# Patient Record
Sex: FEMALE | Race: WHITE | NOT HISPANIC OR LATINO | Employment: OTHER | URBAN - METROPOLITAN AREA
[De-identification: names, ages, dates, MRNs, and addresses within clinical notes are randomized per-mention and may not be internally consistent; named-entity substitution may affect disease eponyms.]

---

## 2020-09-17 ENCOUNTER — CONSULT (OUTPATIENT)
Dept: CARDIOLOGY CLINIC | Facility: CLINIC | Age: 85
End: 2020-09-17
Payer: COMMERCIAL

## 2020-09-17 VITALS
HEART RATE: 83 BPM | TEMPERATURE: 97.1 F | SYSTOLIC BLOOD PRESSURE: 124 MMHG | HEIGHT: 65 IN | DIASTOLIC BLOOD PRESSURE: 80 MMHG | BODY MASS INDEX: 27.19 KG/M2 | WEIGHT: 163.2 LBS | OXYGEN SATURATION: 99 %

## 2020-09-17 DIAGNOSIS — I48.91 ATRIAL FIBRILLATION, UNSPECIFIED TYPE (HCC): Primary | ICD-10-CM

## 2020-09-17 DIAGNOSIS — I10 ESSENTIAL HYPERTENSION: ICD-10-CM

## 2020-09-17 PROCEDURE — 93000 ELECTROCARDIOGRAM COMPLETE: CPT | Performed by: INTERNAL MEDICINE

## 2020-09-17 PROCEDURE — 99205 OFFICE O/P NEW HI 60 MIN: CPT | Performed by: INTERNAL MEDICINE

## 2020-09-17 RX ORDER — POTASSIUM CHLORIDE 1500 MG/1
20 TABLET, EXTENDED RELEASE ORAL DAILY
COMMUNITY
Start: 2020-08-12

## 2020-09-17 RX ORDER — AMLODIPINE BESYLATE AND BENAZEPRIL HYDROCHLORIDE 5; 20 MG/1; MG/1
1 CAPSULE ORAL DAILY
COMMUNITY
Start: 2020-08-12

## 2020-09-17 RX ORDER — APIXABAN 5 MG/1
TABLET, FILM COATED ORAL
COMMUNITY
Start: 2020-09-08 | End: 2021-05-15 | Stop reason: HOSPADM

## 2020-09-17 NOTE — PATIENT INSTRUCTIONS

## 2020-09-17 NOTE — PROGRESS NOTES
Cardiology Consultation     Jose Carmichael  [id number]  1935  [address] [facility] [place] [id number]    1  Atrial fibrillation, unspecified type ([facility] )  POCT ECG   2  Essential hypertension       Chief Complaint   Patient presents with    Establish Care     Establish  Cardiac care- AFIB  No cardiac complaints at this time  HPI: Patient is here for establishment of care  She has afib, diagnosed about a year ago  Patient feels well, without complaints  No reported chest pain, shortness of breath, palpitations, lightheadedness, syncope, LE edema, orthopnea, PND, or significant weight changes  Patient remains active without any increased fatigue out of the ordinary  Patient Active Problem List   Diagnosis    Atrial fibrillation [facility])    Essential hypertension     Past Medical History:   Diagnosis Date    Atrial fibrillation ([facility] )     Hypertension      Social History     Socioeconomic History    Marital status:       Spouse name: Not on file    Number of children: Not on file    Years of education: Not on file    Highest education level: Not on file   Occupational History    Not on file   Social Needs    Financial resource strain: Not on file    Food insecurity     Worry: Not on file     Inability: Not on file    Transportation needs     Medical: Not on file     Non-medical: Not on file   Tobacco Use    Smoking status: Never Smoker    Smokeless tobacco: Never Used   Substance and Sexual Activity    Alcohol use: Not Currently    Drug use: Not Currently    Sexual activity: Not on file   Lifestyle    Physical activity     Days per week: Not on file     Minutes per session: Not on file    Stress: Not on file   Relationships    Social connections     Talks on phone: Not on file     Gets together: Not on file     Attends Sabianism service: Not on file     Active member of club or organization: Not on file     Attends meetings of clubs or organizations: Not on file     Relationship status: Not on file    Intimate partner violence     Fear of current or ex partner: Not on file     Emotionally abused: Not on file     Physically abused: Not on file     Forced sexual activity: Not on file   Other Topics Concern    Not on file   Social History Narrative    Not on file      Family History   Problem Relation Age of Onset    Hypertension Mother     Hyperlipidemia Neg Hx     Heart failure Neg Hx     Heart disease Neg Hx     Heart attack Neg Hx     Arrhythmia Neg Hx     Asthma Neg Hx     Fainting Neg Hx     Clotting disorder Neg Hx     Anemia Neg Hx      Past Surgical History:   Procedure Laterality Date    CHOLECYSTECTOMY         Current Outpatient Medications:     amLODIPine-benazepril (LOTREL 5-20) 5-20 MG per capsule, Take 1 capsule by mouth daily, Disp: , Rfl:     Eliquis 5 MG, , Disp: , Rfl:     Klor-Con M20 20 MEQ tablet, Take 20 mEq by mouth daily, Disp: , Rfl:     Multiple Vitamins-Minerals (PRESERVISION AREDS 2 PO), Take by mouth, Disp: , Rfl:   Allergies   Allergen Reactions    Bactrim [Sulfamethoxazole-Trimethoprim]      Vitals:    09/17/20 0749   BP: 124/80   BP Location: Left arm   Patient Position: Sitting   Cuff Size: Adult   Pulse: 83   Temp: (!) 97 1 °F (36 2 °C)   TempSrc: Tympanic   SpO2: 99%   Weight: 74 kg (163 lb 3 2 oz)   Height: 5' 5" (1 651 m)       Labs:  No results found for any previous visit  No results found for: CHOL, TRIG, HDL, LDLDIRECT  Imaging: No results found  Review of Systems:  Review of Systems   Constitutional: Negative for activity change, appetite change, chills, diaphoresis, fatigue and unexpected weight change  HENT: Negative for hearing loss, nosebleeds and sore throat  Eyes: Negative for photophobia and visual disturbance  Respiratory: Negative for cough, chest tightness, shortness of breath and wheezing  Cardiovascular: Negative for chest pain, palpitations and leg swelling  Gastrointestinal: Negative for abdominal pain, diarrhea, nausea and vomiting  Endocrine: Negative for polyuria  Genitourinary: Negative for dysuria, frequency and hematuria  Musculoskeletal: Negative for arthralgias, back pain, gait problem and neck pain  Skin: Negative for pallor and rash  Neurological: Negative for dizziness, syncope and headaches  Hematological: Does not bruise/bleed easily  Psychiatric/Behavioral: Negative for behavioral problems and confusion  Physical Exam:  Physical Exam  Vitals signs reviewed  Constitutional:       Appearance: She is well-developed  She is not diaphoretic  HENT:      Head: Normocephalic and atraumatic  Nose: Nose normal    Eyes:      General: No scleral icterus  Pupils: Pupils are equal, round, and reactive to light  Neck:      Musculoskeletal: Normal range of motion and neck supple  Vascular: No JVD  Cardiovascular:      Rate and Rhythm: Normal rate and regular rhythm  Heart sounds: Normal heart sounds  No murmur  No friction rub  No gallop  Pulmonary:      Effort: Pulmonary effort is normal  No respiratory distress  Breath sounds: Normal breath sounds  No wheezing or rales  Abdominal:      General: Bowel sounds are normal  There is no distension  Palpations: Abdomen is soft  Tenderness: There is no abdominal tenderness  Musculoskeletal: Normal range of motion  General: No deformity  Skin:     General: Skin is warm and dry  Findings: No rash  Neurological:      Mental Status: She is alert and oriented to person, place, and time  Cranial Nerves: No cranial nerve deficit  Psychiatric:         Behavior: Behavior normal        Blood pressure 124/80, pulse 83, temperature (!) 97 1 °F (36 2 °C), temperature source Tympanic, height 5' 5" (1 651 m), weight 74 kg (163 lb 3 2 oz), SpO2 99 %      EKG:  Atrial fibrillation  Rightward axis  Cannot rule out inferior infarct, age undetermined  Cannot rule out anterior infarct, age undetermined  Abnormal ECG    Discussion/Summary:  Afib: currently in   Cotninuse on Eliquis for [facility]  No rate control medications currently on board  Echo at [facility] in June 2019 revealed normal LV function, mild LVH, mod dilated LA, aortic sclerosis, mild MR, mild TR  Holter monitor in Oct 2019 revealed rate controlled afib  HTN: well controlled on combination amlodipine-benazepril

## 2021-03-10 ENCOUNTER — IMMUNIZATIONS (OUTPATIENT)
Dept: FAMILY MEDICINE CLINIC | Facility: HOSPITAL | Age: 86
End: 2021-03-10

## 2021-03-10 DIAGNOSIS — Z23 ENCOUNTER FOR IMMUNIZATION: Primary | ICD-10-CM

## 2021-03-10 PROCEDURE — 91300 SARS-COV-2 / COVID-19 MRNA VACCINE (PFIZER-BIONTECH) 30 MCG: CPT

## 2021-03-10 PROCEDURE — 0001A SARS-COV-2 / COVID-19 MRNA VACCINE (PFIZER-BIONTECH) 30 MCG: CPT

## 2021-04-02 ENCOUNTER — IMMUNIZATIONS (OUTPATIENT)
Dept: FAMILY MEDICINE CLINIC | Facility: HOSPITAL | Age: 86
End: 2021-04-02

## 2021-04-02 DIAGNOSIS — Z23 ENCOUNTER FOR IMMUNIZATION: Primary | ICD-10-CM

## 2021-04-02 PROCEDURE — 91300 SARS-COV-2 / COVID-19 MRNA VACCINE (PFIZER-BIONTECH) 30 MCG: CPT

## 2021-04-02 PROCEDURE — 0002A SARS-COV-2 / COVID-19 MRNA VACCINE (PFIZER-BIONTECH) 30 MCG: CPT

## 2021-05-14 ENCOUNTER — APPOINTMENT (OUTPATIENT)
Dept: MRI IMAGING | Facility: HOSPITAL | Age: 86
End: 2021-05-14
Payer: COMMERCIAL

## 2021-05-14 ENCOUNTER — APPOINTMENT (EMERGENCY)
Dept: CT IMAGING | Facility: HOSPITAL | Age: 86
End: 2021-05-14
Payer: COMMERCIAL

## 2021-05-14 ENCOUNTER — HOSPITAL ENCOUNTER (OUTPATIENT)
Facility: HOSPITAL | Age: 86
Setting detail: OBSERVATION
Discharge: HOME | End: 2021-05-15
Attending: EMERGENCY MEDICINE | Admitting: INTERNAL MEDICINE
Payer: COMMERCIAL

## 2021-05-14 DIAGNOSIS — D75.1 POLYCYTHEMIA: ICD-10-CM

## 2021-05-14 DIAGNOSIS — R41.82 CHANGE IN MENTAL STATUS: Primary | ICD-10-CM

## 2021-05-14 DIAGNOSIS — R41.4 HEMINEGLECT: ICD-10-CM

## 2021-05-14 DIAGNOSIS — I10 ESSENTIAL HYPERTENSION: ICD-10-CM

## 2021-05-14 DIAGNOSIS — R29.90 STROKE-LIKE SYMPTOMS: ICD-10-CM

## 2021-05-14 DIAGNOSIS — R51.9 HEADACHE: ICD-10-CM

## 2021-05-14 DIAGNOSIS — R41.0 CONFUSION: ICD-10-CM

## 2021-05-14 DIAGNOSIS — I48.91 ATRIAL FIBRILLATION, UNSPECIFIED TYPE (HCC): ICD-10-CM

## 2021-05-14 LAB
ANION GAP SERPL CALCULATED.3IONS-SCNC: 9 MMOL/L (ref 4–13)
APTT PPP: 30 SECONDS (ref 23–37)
BACTERIA UR QL AUTO: ABNORMAL /HPF
BILIRUB UR QL STRIP: NEGATIVE
BUN SERPL-MCNC: 12 MG/DL (ref 5–25)
CALCIUM SERPL-MCNC: 8.3 MG/DL (ref 8.3–10.1)
CHLORIDE SERPL-SCNC: 102 MMOL/L (ref 100–108)
CLARITY UR: ABNORMAL
CO2 SERPL-SCNC: 23 MMOL/L (ref 21–32)
COLOR UR: YELLOW
CREAT SERPL-MCNC: 0.79 MG/DL (ref 0.6–1.3)
ERYTHROCYTE [DISTWIDTH] IN BLOOD BY AUTOMATED COUNT: 14.1 % (ref 11.6–15.1)
GFR SERPL CREATININE-BSD FRML MDRD: 68 ML/MIN/1.73SQ M
GLUCOSE P FAST SERPL-MCNC: 112 MG/DL (ref 65–99)
GLUCOSE SERPL-MCNC: 112 MG/DL (ref 65–140)
GLUCOSE SERPL-MCNC: 138 MG/DL (ref 65–140)
GLUCOSE UR STRIP-MCNC: NEGATIVE MG/DL
HCT VFR BLD AUTO: 48.3 % (ref 34.8–46.1)
HGB BLD-MCNC: 16 G/DL (ref 11.5–15.4)
HGB UR QL STRIP.AUTO: ABNORMAL
INR PPP: 1.08 (ref 0.84–1.19)
KETONES UR STRIP-MCNC: ABNORMAL MG/DL
LEUKOCYTE ESTERASE UR QL STRIP: ABNORMAL
MCH RBC QN AUTO: 29.1 PG (ref 26.8–34.3)
MCHC RBC AUTO-ENTMCNC: 33.1 G/DL (ref 31.4–37.4)
MCV RBC AUTO: 88 FL (ref 82–98)
NITRITE UR QL STRIP: NEGATIVE
NON-SQ EPI CELLS URNS QL MICRO: ABNORMAL /HPF
PH UR STRIP.AUTO: 6 [PH]
PLATELET # BLD AUTO: 254 THOUSANDS/UL (ref 149–390)
PMV BLD AUTO: 10.6 FL (ref 8.9–12.7)
POTASSIUM SERPL-SCNC: 3.9 MMOL/L (ref 3.5–5.3)
PROT UR STRIP-MCNC: NEGATIVE MG/DL
PROTHROMBIN TIME: 14.2 SECONDS (ref 11.6–14.5)
RBC # BLD AUTO: 5.49 MILLION/UL (ref 3.81–5.12)
RBC #/AREA URNS AUTO: ABNORMAL /HPF
SARS-COV-2 RNA RESP QL NAA+PROBE: NEGATIVE
SODIUM SERPL-SCNC: 134 MMOL/L (ref 136–145)
SP GR UR STRIP.AUTO: 1.01 (ref 1–1.03)
TROPONIN I SERPL-MCNC: <0.02 NG/ML
TSH SERPL DL<=0.05 MIU/L-ACNC: 0.91 UIU/ML (ref 0.36–3.74)
UROBILINOGEN UR QL STRIP.AUTO: 0.2 E.U./DL
WBC # BLD AUTO: 10.55 THOUSAND/UL (ref 4.31–10.16)
WBC #/AREA URNS AUTO: ABNORMAL /HPF

## 2021-05-14 PROCEDURE — G1004 CDSM NDSC: HCPCS

## 2021-05-14 PROCEDURE — 36415 COLL VENOUS BLD VENIPUNCTURE: CPT | Performed by: EMERGENCY MEDICINE

## 2021-05-14 PROCEDURE — 85610 PROTHROMBIN TIME: CPT | Performed by: EMERGENCY MEDICINE

## 2021-05-14 PROCEDURE — 99205 OFFICE O/P NEW HI 60 MIN: CPT | Performed by: PSYCHIATRY & NEUROLOGY

## 2021-05-14 PROCEDURE — U0005 INFEC AGEN DETEC AMPLI PROBE: HCPCS | Performed by: EMERGENCY MEDICINE

## 2021-05-14 PROCEDURE — 80048 BASIC METABOLIC PNL TOTAL CA: CPT | Performed by: EMERGENCY MEDICINE

## 2021-05-14 PROCEDURE — 99220 PR INITIAL OBSERVATION CARE/DAY 70 MINUTES: CPT | Performed by: INTERNAL MEDICINE

## 2021-05-14 PROCEDURE — 82948 REAGENT STRIP/BLOOD GLUCOSE: CPT

## 2021-05-14 PROCEDURE — 70551 MRI BRAIN STEM W/O DYE: CPT

## 2021-05-14 PROCEDURE — 85027 COMPLETE CBC AUTOMATED: CPT | Performed by: EMERGENCY MEDICINE

## 2021-05-14 PROCEDURE — 99285 EMERGENCY DEPT VISIT HI MDM: CPT

## 2021-05-14 PROCEDURE — U0003 INFECTIOUS AGENT DETECTION BY NUCLEIC ACID (DNA OR RNA); SEVERE ACUTE RESPIRATORY SYNDROME CORONAVIRUS 2 (SARS-COV-2) (CORONAVIRUS DISEASE [COVID-19]), AMPLIFIED PROBE TECHNIQUE, MAKING USE OF HIGH THROUGHPUT TECHNOLOGIES AS DESCRIBED BY CMS-2020-01-R: HCPCS | Performed by: EMERGENCY MEDICINE

## 2021-05-14 PROCEDURE — 85730 THROMBOPLASTIN TIME PARTIAL: CPT | Performed by: EMERGENCY MEDICINE

## 2021-05-14 PROCEDURE — 84484 ASSAY OF TROPONIN QUANT: CPT | Performed by: EMERGENCY MEDICINE

## 2021-05-14 PROCEDURE — 70498 CT ANGIOGRAPHY NECK: CPT

## 2021-05-14 PROCEDURE — 99285 EMERGENCY DEPT VISIT HI MDM: CPT | Performed by: EMERGENCY MEDICINE

## 2021-05-14 PROCEDURE — 81001 URINALYSIS AUTO W/SCOPE: CPT | Performed by: INTERNAL MEDICINE

## 2021-05-14 PROCEDURE — 93005 ELECTROCARDIOGRAM TRACING: CPT

## 2021-05-14 PROCEDURE — 70496 CT ANGIOGRAPHY HEAD: CPT

## 2021-05-14 PROCEDURE — 84443 ASSAY THYROID STIM HORMONE: CPT | Performed by: INTERNAL MEDICINE

## 2021-05-14 RX ORDER — ASPIRIN 325 MG
325 TABLET ORAL ONCE
Status: COMPLETED | OUTPATIENT
Start: 2021-05-14 | End: 2021-05-14

## 2021-05-14 RX ORDER — ATORVASTATIN CALCIUM 40 MG/1
40 TABLET, FILM COATED ORAL EVERY EVENING
Status: DISCONTINUED | OUTPATIENT
Start: 2021-05-14 | End: 2021-05-15 | Stop reason: HOSPADM

## 2021-05-14 RX ORDER — ASPIRIN 81 MG/1
81 TABLET, CHEWABLE ORAL DAILY
Status: DISCONTINUED | OUTPATIENT
Start: 2021-05-15 | End: 2021-05-15 | Stop reason: HOSPADM

## 2021-05-14 RX ADMIN — DESMOPRESSIN ACETATE 40 MG: 0.2 TABLET ORAL at 20:19

## 2021-05-14 RX ADMIN — IOHEXOL 85 ML: 350 INJECTION, SOLUTION INTRAVENOUS at 18:02

## 2021-05-14 RX ADMIN — SODIUM CHLORIDE 1000 ML: 0.9 INJECTION, SOLUTION INTRAVENOUS at 19:19

## 2021-05-14 RX ADMIN — ASPIRIN 325 MG: 325 TABLET ORAL at 18:56

## 2021-05-14 NOTE — CONSULTS
PATIENT NAME: Sterling Nunez OF BIRTH: 1935   MEDICAL RECORD NUMBER: [id number]  Chief Complaint/Reason for Consult: stroke alert  Alert called: 5:50 PM  At bedside: 5:52 PM  tPA - no, on eliquis  NIHSS  - 3    HPI: Mina Mcpherson is a 80 y o  female with history a fib on elius and HTN came in with headache and some confusion and neglect noted in ER  This had never happened before  No other associated symptoms  PAST MEDICAL HISTORY  Past Medical History:   Diagnosis Date    Atrial fibrillation ([address] )     Hypertension         PAST SURGICAL HISTORY  Past Surgical History:   Procedure Laterality Date    CHOLECYSTECTOMY          ALLERGIES: Bactrim [sulfamethoxazole-trimethoprim]     CURRENT MEDICATIONS  Scheduled Meds:  Continuous Infusions:No current facility-administered medications for this encounter  PRN Meds:  SOCIAL HISTORY   reports that she has never smoked  She has never used smokeless tobacco  She reports previous alcohol use  She reports previous drug use  FAMILY HISTORY  Family History   Problem Relation Age of Onset    Hypertension Mother     Hyperlipidemia Neg Hx     Heart failure Neg Hx     Heart disease Neg Hx     Heart attack Neg Hx     Arrhythmia Neg Hx     Asthma Neg Hx     Fainting Neg Hx     Clotting disorder Neg Hx     Anemia Neg Hx         REVIEW OF SYSTEMS   Extensive 12 point ROS conducted, negative except for HPI  PHYSICAL EXAMINATION  Temp:  [97 8 °F (36 6 °C)] 97 8 °F (36 6 °C)  HR:  [96-97] 97  Resp:  [16] 16  BP: (131-137)/(68-71) 137/68   General Examination: In no apparent distress, well developed and well nourished, and cooperative   HEENT: Normocephalic, Atraumatic  Moist mucus membranes  Anicteric  PPC    CVS: Regular rate and rhythm  S1 S2 noted  No audible murmurs  No carotids bruits  Peripheral pulses palpable throughout   Lungs: Clear to auscultation bilaterally  No rales, rhonchi, wheezing  Abdomen: Bowel sounds positive  Non- tender  Non-distended  No organomegaly  Ext: No edema   Psych: Thought content - No VH/AH  No delusions  Though Process - logical    Skin - No rash    Neurological Examination:      Mental Status: The patient was awake, alert, attentive, oriented to person, place, and time  Recent and remote memory intact to conversation with no evidence of language dysfunction  Satisfactory fund of knowledge  Normal attention span and concentration  Able to name, repeat  Some extinction with simultaneous tactile stimuli  Cranial Nerves:   I: smell Not tested   II: visual fields Full to confrontation  Pupils equal, round, reactive to light with normal accomodation  V: masseter and pterygoid strength full  Sensation in the V1 through V3 distributions intact to pinprick and light touch bilaterally  VII: Face is symmetric with no weakness noted  VIII: Audition intact to finger rub bilaterally  IX/X: Uvula midline  Soft palate elevation symmetric  XI: Trapezius and SCM strength 5/5 B/L  XII: Tongue midline with no atrophy or fasciculations with appropriate movement  Motor Examination:   Trace left drift, otherwise full strength throughout  Reflexes:                   Biceps Brachioradialis Triceps Patella Achilles Plantars   Right          2+            2+                  2+        2+       2+         Down   Left            2+             2+                 2+         2+       2+         Down     Clonus: None    Pathological Reflexes:  Hoffmans: negative  Babinsky: negative  Jaw Jerk: negative    Coordination: Patient able to perform normal finger-to-nose and heel to shin appropriately  Normal rapid alternating movements  Sensory: Normal sensation to light touch, pin prick and vibratory sensation throughout  Gait:deferred during stroke alert  Labs:  No results found for this or any previous visit (from the past 24 hour(s))           panel      Invalid input(s): BICARBONATE          Invalid input(s): LABPT       Invalid input(s): POTASSIUM, GFR No results found for: ALT, AST, GGT, ALKPHOS, TBILI       Invalid input(s): TRIGLYCERIDE No results found for: HGBA1C TSH i: No results found for: TSH Imaging: CT head     CT head - no acute disease  CTA h/n - no LVO    Images personally reviewed  ASSESSMENT/PLAN  79 yo female with stroke vs migraine  We will perform a full stroke work up will be done including:   MRI BRAIN without contrast to evaluate for any evidence of infarct  CTA head and neck to evaluate cerebral vasculature  2D ECHO with bubble study  Labs: lipid panel, HbA1C, TSH  PT/OT/Speech    Neurochecks q 4 hours   DVT PPX   Euglycemia (140-180 goal)   Normothermi  Aspirin 325  Atorvastatin 80    Total critical care time 32 minutes during stroke alert  Please call with questions

## 2021-05-14 NOTE — ED PROVIDER NOTES
History  Chief Complaint   Patient presents with    Altered Mental Status     Pt family reports pt calling daughter around  complaining of a migraine, states she was very disoriented and not making any sense  +dizziness  Pt following directions at time of triage but not answering questions appropriately  81 y/o F with PMH of A-fib on eliquis, HTN, and migrane headaches who started to feel generalized malaise, headache and confusion today between 2:45-3 pm, at which point called her daughter to  and was brought in to the ED  Patient has never had any episodes like this and lives by her self and is able to do all her ADLS  Patient's daughter had seen her in her USOH yesterday evening  During her episode of confusion, patient wasn't having any weakness of her lower or upper extremities, no facial droop noticed  Patient denies any recent fevers, chills  No abdominal pain, denies any dysuria or hematuria  No CP, SOB or palpitations  History provided by:  Patient and relative  Altered Mental Status  Presenting symptoms: confusion    Severity:  Moderate  Most recent episode: Today  Episode history:  Continuous  Associated symptoms: headaches and weakness    Associated symptoms: no abdominal pain, no palpitations and no vomiting        Prior to Admission Medications   Prescriptions Last Dose Informant Patient Reported? Taking?    Eliquis 5 MG 5/14/2021 at Unknown time Self Yes Yes   Klor-Con M20 20 MEQ tablet 5/14/2021 at Unknown time Self Yes Yes   Sig: Take 20 mEq by mouth daily   Multiple Vitamins-Minerals (PRESERVISION AREDS 2 PO) Not Taking at Unknown time Self Yes No   Sig: Take by mouth   amLODIPine-benazepril (LOTREL 5-20) 5-20 MG per capsule 5/14/2021 at Unknown time Self Yes Yes   Sig: Take 1 capsule by mouth daily      Facility-Administered Medications: None       Past Medical History:   Diagnosis Date    [facility] )     Hypertension        Past Surgical History:   Procedure Laterality Date    CHOLECYSTECTOMY         Family History   Problem Relation Age of Onset    Hypertension Mother     Hyperlipidemia Neg Hx     Heart failure Neg Hx     Heart disease Neg Hx     Heart attack Neg Hx     Arrhythmia Neg Hx     Asthma Neg Hx     Fainting Neg Hx     Clotting disorder Neg Hx     Anemia Neg Hx      I have reviewed and agree with the history as documented  E-Cigarette/Vaping     E-Cigarette/Vaping Substances    Nicotine No     THC No     CBD No     Flavoring No     Unknown No      Social History     Tobacco Use    Smoking status: Never Smoker    Smokeless tobacco: Never Used   Substance Use Topics    Alcohol use: Not Currently    Drug use: Not Currently        Review of Systems   Constitutional: Negative for appetite change, chills, diaphoresis and fatigue  Respiratory: Negative for choking, chest tightness, shortness of breath and wheezing  Cardiovascular: Negative for chest pain and palpitations  Gastrointestinal: Negative for abdominal distention, abdominal pain, diarrhea and vomiting  Musculoskeletal: Negative  Skin: Negative for color change and wound  Neurological: Positive for speech difficulty, weakness and headaches  Psychiatric/Behavioral: Positive for confusion  All other systems reviewed and are negative        Physical Exam  ED Triage Vitals   Temperature Pulse Respirations Blood Pressure SpO2   05/14/21 1743 05/14/21 1739 05/14/21 1739 05/14/21 1739 05/14/21 1739   97 8 °F (36 6 °C) 96 16 131/71 97 %      Temp Source Heart Rate Source Patient Position - Orthostatic VS BP Location FiO2 (%)   05/14/21 1743 05/14/21 1739 05/14/21 1739 05/14/21 1739 --   Temporal Monitor Sitting Left arm       Pain Score       --                    Orthostatic Vital Signs  Vitals:    05/14/21 1830 05/14/21 1845 05/14/21 1900 05/14/21 1915   BP: 130/61 120/62 131/73 127/58   Pulse: 92 76 (!) 106 74   Patient Position - Orthostatic VS:           Physical Exam  Vitals signs reviewed  Constitutional:       Appearance: She is well-developed  HENT:      Head: Normocephalic and atraumatic  Eyes:      General: No visual field deficit  Extraocular Movements: Extraocular movements intact  Pupils: Pupils are equal, round, and reactive to light  Neck:      Musculoskeletal: Normal range of motion and neck supple  Cardiovascular:      Rate and Rhythm: Normal rate  Rhythm irregular  Heart sounds: Normal heart sounds  No murmur  No gallop  Pulmonary:      Effort: Pulmonary effort is normal  No respiratory distress  Breath sounds: No wheezing or rales  Abdominal:      General: Bowel sounds are normal  There is no distension  Palpations: Abdomen is soft  Tenderness: There is no abdominal tenderness  Musculoskeletal: Normal range of motion  General: No swelling or tenderness  Skin:     General: Skin is warm and dry  Capillary Refill: Capillary refill takes less than 2 seconds  Coloration: Skin is not cyanotic  Neurological:      Mental Status: She is oriented to person, place, and time  She is lethargic  Cranial Nerves: No cranial nerve deficit, dysarthria or facial asymmetry  Sensory: No sensory deficit  Motor: No weakness, tremor or pronator drift  Coordination: Coordination normal       Comments: Patient's speech is slow but not dysarthric  Patient is exhibiting left-sided neglect   Patient is unable to   Patient's strength on left upper extremities 3/5  Patient has drift left lower extremity  Patient has normal strength on both LEs         ED Medications  Medications   sodium chloride 0 9 % bolus 1,000 mL (1,000 mL Intravenous New Bag 5/14/21 1919)   aspirin chewable tablet 81 mg (has no administration in time range)   atorvastatin (LIPITOR) tablet 40 mg (40 mg Oral Given 5/14/21 2019)   iohexol (OMNIPAQUE) 350 MG/ML injection (SINGLE-DOSE) 85 mL (85 mL Intravenous Given 5/14/21 1802) aspirin tablet 325 mg (325 mg Oral Given 5/14/21 1856)       Diagnostic Studies  Results Reviewed     Procedure Component Value Units Date/Time    Basic metabolic panel [[id number]] Collected: 05/14/21 2023    Lab Status: In process Specimen: Blood from Arm, Right Updated: 05/14/21 2030    TSH [[id number]] Collected: 05/14/21 2023    Lab Status: In process Specimen: Blood from Arm, Right Updated: 05/14/21 2030    Urinalysis with microscopic [[id number]]  (Abnormal) Collected: 05/14/21 1830    Lab Status: Final result Specimen: Urine, Straight Cath Updated: 05/14/21 1913     Clarity, UA Slightly Cloudy     Color, UA Yellow     Specific Gravity, UA 1 015     pH, UA 6 0     Glucose, UA Negative mg/dl      Ketones, UA 15 (1+) mg/dl      Blood, UA Trace-Intact     Protein, UA Negative mg/dl      Nitrite, UA Negative     Bilirubin, UA Negative     Urobilinogen, UA 0 2 E U /dl      Leukocytes, UA Trace     WBC, UA 10-20 /hpf      RBC, UA None Seen /hpf      Bacteria, UA Innumerable /hpf      Epithelial Cells Occasional /hpf     Novel Coronavirus (Covid-19),PCR [facility] - 2 hour stat [[id number]]  (Normal) Collected: 05/14/21 1807    Lab Status: Final result Specimen: Nares from Nose Updated: 05/14/21 1909     SARS-CoV-2 Negative    Narrative: The specimen collection materials, transport medium, and/or testing methodology utilized in the production of these test results have been proven to be reliable in a limited validation with an abbreviated program under the Emergency Utilization Authorization provided by the FDA  Testing reported as "Presumptive positive" will be confirmed with secondary testing to ensure result accuracy  Clinical caution and judgement should be used with the interpretation of these results with consideration of the clinical impression and other laboratory testing  Testing reported as "Positive" or "Negative" has been proven to be accurate according to standard laboratory validation requirements  All testing is performed with control materials showing appropriate reactivity at standard intervals  Troponin I [[id number]]  (Normal) Collected: 05/14/21 1807    Lab Status: Final result Specimen: Blood from Arm, Right Updated: 05/14/21 1842     Troponin I <0 02 ng/mL     Protime-INR [[id number]]  (Normal) Collected: 05/14/21 1806    Lab Status: Final result Specimen: Blood from Arm, Right Updated: 05/14/21 1834     Protime 14 2 seconds      INR 1 08    APTT [[id number]]  (Normal) Collected: 05/14/21 1806    Lab Status: Final result Specimen: Blood from Arm, Right Updated: 05/14/21 1834     PTT 30 seconds     CBC and Platelet [[id number]]  (Abnormal) Collected: 05/14/21 1807    Lab Status: Final result Specimen: Blood from Arm, Right Updated: 05/14/21 1818     WBC 10 55 Thousand/uL      RBC 5 49 Million/uL      Hemoglobin 16 0 g/dL      Hematocrit 48 3 %      MCV 88 fL      MCH 29 1 pg      MCHC 33 1 g/dL      RDW 14 1 %      Platelets 952 Thousands/uL      MPV 10 6 fL     Fingerstick Glucose (POCT) [[id number]]  (Normal) Collected: 05/14/21 1743    Lab Status: Final result Updated: 05/14/21 1810     POC Glucose 138 mg/dl                  CTA stroke alert (head/neck)   Final Result by Carolina Campos MD (05/14 1817)      No significant stenosis of the cervical carotid or vertebral arteries   No significant canal stenosis, vessel occlusion or aneurysm  Findings were directly discussed with Lyndsay Erickson on 5/14/2021 6:15 PM                      Workstation performed: [id number]         CT stroke alert brain   Final Result by Carolina Campos MD (05/14 1817)      No acute intracranial abnormality  Microangiopathic changes        Findings were directly discussed with Lyndsay Erickson on 5/14/2021 6:15 PM       Workstation performed: [id number]         MRI brain wo contrast    (Results Pending)         Procedures  Procedures      ED Course  ED Course as of May 14 2042   [facility] May 14, 2021   1843 - Stroke Alert called  - Patient on home eliquis and is not a tpa candidate   - CT does not show any acute bleeds   -Neurology already seen the patient  - Will maintain permissive HTN   -        1906 Patient's blood pressure is 120/90  Start patient on IV fluids to maintain permissive hypertension      1907 Patient's neurological examination is unchanged  Patient has neglect seems to be improving                    Stroke Assessment     Row Name 05/14/21 1814             NIH Stroke Scale    Interval  Baseline      Level of Consciousness (1a )  0 Simultaneous filing  User may not have seen previous data  LOC Questions (1b )  0 Simultaneous filing  User may not have seen previous data  LOC Commands (1c )  0 Simultaneous filing  User may not have seen previous data  Best Gaze (2 )  0 Simultaneous filing  User may not have seen previous data  Visual (3 )  0 Simultaneous filing  User may not have seen previous data  Facial Palsy (4 )  0 Simultaneous filing  User may not have seen previous data  Motor Arm, Left (5a )  2 Simultaneous filing  User may not have seen previous data  Motor Arm, Right (5b )  1 Simultaneous filing  User may not have seen previous data  Motor Leg, Left (6a )  1 Simultaneous filing  User may not have seen previous data  Motor Leg, Right (6b )  0 Simultaneous filing  User may not have seen previous data  Limb Ataxia (7 )  0 Simultaneous filing  User may not have seen previous data  Sensory (8 )  0 Simultaneous filing  User may not have seen previous data  Best Language (9 )  0 Simultaneous filing  User may not have seen previous data  Dysarthria (10 )  1 Simultaneous filing  User may not have seen previous data  Extinction and Inattention (11 ) (Formerly Neglect)  1 Simultaneous filing  User may not have seen previous data  Total  6 Simultaneous filing  User may not have seen previous data            First Embly Value   TPA Decision  Patient not a TPA candidate  [Simultaneous filing  User may not have seen previous data ]   Patient is not a candidate options  Bleeding risk  SBIRT 20yo+      Most Recent Value   SBIRT (22 yo +)   In order to provide better care to our patients, we are screening all of our patients for alcohol and drug use  Would it be okay to ask you these screening questions? Unable to answer at this time Filed at: 05/14/2021 1740                MDM    Disposition  Final diagnoses:   Change in mental status     Time reflects when diagnosis was documented in both MDM as applicable and the Disposition within this note     Time User Action Codes Description Comment    5/14/2021  7:43 PM Mohan Fletcher Add [R41 [id number]] Change in mental status       ED Disposition     ED Disposition Condition Date/Time Comment    Admit Stable Fri May 14, 2021  7:44 PM Case was discussed with Dr Juarez and the patient's admission status was agreed to be Admission Status: inpatient status to the service of Dr Dinh Speaks   Follow-up Information    None         Patient's Medications   Discharge Prescriptions    No medications on file     No discharge procedures on file  PDMP Review     None           ED Provider  Attending physically available and evaluated Manjinder Zaragoza I managed the patient along with the ED Attending      Electronically Signed by         Luis Bowling MD  05/14/21 8928

## 2021-05-14 NOTE — ASSESSMENT & PLAN NOTE
Expressive and receptive aphasia     TIA/stroke pathway   No tPA as per neurology   Hold eliquis as per neurology

## 2021-05-14 NOTE — ED ATTENDING ATTESTATION
5/14/2021  I, Aruna Chaidez MD, saw and evaluated the patient  I have discussed the patient with the resident/non-physician practitioner and agree with the resident's/non-physician practitioner's findings, Plan of Care, and MDM as documented in the resident's/non-physician practitioner's note, except where noted  All available labs and Radiology studies were reviewed  I was present for key portions of any procedure(s) performed by the resident/non-physician practitioner and I was immediately available to provide assistance  At this point I agree with the current assessment done in the Emergency Department  I have conducted an independent evaluation of this patient a history and physical is as follows:    80-year-old female with history atrial fibrillation on Eliquis presenting for evaluation of headache and confusion  Patient is accompanied by her daughter who supplements the history  They state that at a quarter to 3 the patient developed a gradual in onset frontal headache that was initially severe but is now more mild  Accompanied by feeling of confusion  She called her daughter and was confused on the phone, so her daughter caught her came to her home and brought her in for evaluation  Patient reports feeling mildly confused and is slow to answer questions  She is noted to have left favio-neglect on exam   She is not dysarthric  Denies chest pain, trouble breathing, abdominal pain, dysuria, frequency, or difficulty breathing  No recent falls  Please see resident note for full exam   On my neurologic exam, patient has left hemineglect with drift noted to the left leg and left arm  She does not follow commands when asked to move the left side of her body but is able to hold up her left arm when it is raised off the bed by the examiner  Her  strength is noted to be 4/5 on the left compared to 5/5 on the right  Her face is symmetric, tongue midline  Cranial nerves 2-12 intact    Denies vision loss  She is oriented to month, year, place, and situation, however is slow to answer questions which per her daughter is not her baseline  NIH stroke scale of 4 on arrival   Stroke alert activated and patient evaluated by Dr Yfn Hardwick with neurology at bedside  Stroke alert imaging with no acute abnormalities and no head bleed  Will admit to stroke pathway  She is not a candidate for tPA as she takes Eliquis  Full strength aspirin given per Neurology recommendations  Will also perform metabolic workup including labs, TSH, and urine  I personally interpreted the patient's EKG which reveals atrial fibrillation ventricular rate of 92 beats per minute, normal axis, normal intervals no ST segment deviation  No prior available for comparison        ED Course         Critical Care Time  Procedures

## 2021-05-15 VITALS
OXYGEN SATURATION: 95 % | HEART RATE: 88 BPM | HEIGHT: 63 IN | WEIGHT: 165.34 LBS | DIASTOLIC BLOOD PRESSURE: 88 MMHG | TEMPERATURE: 97.5 F | BODY MASS INDEX: 29.3 KG/M2 | SYSTOLIC BLOOD PRESSURE: 120 MMHG | RESPIRATION RATE: 16 BRPM

## 2021-05-15 PROBLEM — R29.90 STROKE-LIKE SYMPTOMS: Status: ACTIVE | Noted: 2021-05-15

## 2021-05-15 PROBLEM — D75.1 POLYCYTHEMIA: Status: ACTIVE | Noted: 2021-05-15

## 2021-05-15 PROBLEM — R82.71 ASYMPTOMATIC BACTERIURIA: Status: ACTIVE | Noted: 2021-05-15

## 2021-05-15 LAB
ATRIAL RATE: 96 BPM
CHOLEST SERPL-MCNC: 173 MG/DL (ref 50–200)
EST. AVERAGE GLUCOSE BLD GHB EST-MCNC: 117 MG/DL
HBA1C MFR BLD: 5.7 %
HDLC SERPL-MCNC: 55 MG/DL
LDLC SERPL CALC-MCNC: 110 MG/DL (ref 0–100)
QRS AXIS: 121 DEGREES
QRSD INTERVAL: 78 MS
QT INTERVAL: 318 MS
QTC INTERVAL: 394 MS
T WAVE AXIS: 191 DEGREES
TRIGL SERPL-MCNC: 41 MG/DL
VENTRICULAR RATE: 92 BPM

## 2021-05-15 PROCEDURE — 97166 OT EVAL MOD COMPLEX 45 MIN: CPT

## 2021-05-15 PROCEDURE — 93010 ELECTROCARDIOGRAM REPORT: CPT | Performed by: INTERNAL MEDICINE

## 2021-05-15 PROCEDURE — 92610 EVALUATE SWALLOWING FUNCTION: CPT

## 2021-05-15 PROCEDURE — 99217 PR OBSERVATION CARE DISCHARGE MANAGEMENT: CPT | Performed by: INTERNAL MEDICINE

## 2021-05-15 PROCEDURE — 97163 PT EVAL HIGH COMPLEX 45 MIN: CPT

## 2021-05-15 PROCEDURE — 83036 HEMOGLOBIN GLYCOSYLATED A1C: CPT | Performed by: INTERNAL MEDICINE

## 2021-05-15 PROCEDURE — 80061 LIPID PANEL: CPT | Performed by: INTERNAL MEDICINE

## 2021-05-15 RX ORDER — ATORVASTATIN CALCIUM 40 MG/1
40 TABLET, FILM COATED ORAL EVERY EVENING
Qty: 30 TABLET | Refills: 0 | Status: SHIPPED | OUTPATIENT
Start: 2021-05-15

## 2021-05-15 RX ORDER — ASPIRIN 81 MG/1
81 TABLET, CHEWABLE ORAL DAILY
Qty: 30 TABLET | Refills: 0 | Status: SHIPPED | OUTPATIENT
Start: 2021-05-16 | End: 2021-06-15

## 2021-05-15 RX ADMIN — ASPIRIN 81 MG: 81 TABLET, CHEWABLE ORAL at 08:17

## 2021-05-15 RX ADMIN — APIXABAN 5 MG: 5 TABLET, FILM COATED ORAL at 16:53

## 2021-05-15 RX ADMIN — APIXABAN 5 MG: 5 TABLET, FILM COATED ORAL at 11:34

## 2021-05-15 RX ADMIN — DESMOPRESSIN ACETATE 40 MG: 0.2 TABLET ORAL at 16:54

## 2021-05-15 NOTE — ASSESSMENT & PLAN NOTE
· Afib on AC  · Follows up with outpatient Cardiology  · Not on a beta-blocker  · With notable physiologic bradycardia during sleep  Heart rate would drop to 30s to 40s range while asleep  · Heart rate reviewed during the daytime when patient is awake, heart rate in the 70s range

## 2021-05-15 NOTE — UTILIZATION REVIEW
Initial Clinical Review    Admission: Date/Time/Statement:   Admission Orders (From admission, onward)     Ordered        05/14/21 1942  Place in Observation  Once                   Orders Placed This Encounter   Procedures    Place in Observation     Standing Status:   Standing     Number of Occurrences:   1     Order Specific Question:   Level of Care     Answer:   Med Surg [16]     ED Arrival Information     Expected Arrival Acuity Means of Arrival Escorted By Service Admission Type    - 5/14/2021 17:19 [facility] Emergency    [address]        Chief Complaint   Patient presents with    Altered Mental Status     Pt family reports pt calling daughter around  complaining of a migraine, states she was very disoriented and not making any sense  +dizziness  Pt following directions at time of triage but not answering questions appropriately  Initial Presentation: 80 y o  female with hx AFib on Eliquis and hypertension who presents to ED from home with sudden-onset confusion this morning with HA  Stroke alert called and decision was made not given any tPA at this time  On exam, pt answering some questions appropriately but other times having word-finding difficulties and not understanding the question  Appears like expressive and receptive aphasia  Exhibiting left-sided neglect,strength on left upper extremities 3/5has drift left lower extremity , has normal strength on both LEs   Heart rhythm irregular  Pt given ASA statin and IVF in ED  CT and CTA shows nothing acute  NIHSS 4  Pt admitted to telemetry with change in mental status-Expressive and receptive aphasia on TIA/stroke path  Plan-telemetry, Eliquis on Hold, obtain MRI brain, PT/OT/ST, neurochecks, neurology consult,ASA and statin  Neurology-recommend hold anticoag for now  Date: 5/15   Day 2:   MRI w/o evidence of acute stroke, plan to restart Eliquis  Pt currently w/o neuro deficits   Etiology of acute confusion unclear-differential diagnosis include TIA versus migraine  Will continue ASA and statin  UA positive for bacteria, WBC, pt denies any urinary symptoms, afebrile, will monitor off abx  Will restart home antihypertensive medications and monitor BPpt bradycardic to 30-40 while sleeping, 70's whiole awake, not on BB  ST-Oral and pharyngeal stages of swallowing appeared WNL, no overt s/s aspiration or c/o dysphagia symptoms  Per nsg, pt took meds w/ water w/o difficulty   Recommend regular diet and thin liquids  ED Triage Vitals   Temperature Pulse Respirations Blood Pressure SpO2   05/14/21 1743 05/14/21 1739 05/14/21 1739 05/14/21 1739 05/14/21 1739   97 8 °F (36 6 °C) 96 16 131/71 97 %      Temp Source Heart Rate Source Patient Position - Orthostatic VS BP Location FiO2 (%)   05/14/21 1743 05/14/21 1739 05/14/21 1739 05/14/21 1739 --   Temporal Monitor Sitting Left arm       Pain Score       05/14/21 1746       5          Wt Readings from Last 1 Encounters:   05/15/21 75 kg (165 lb 5 5 oz)     Additional Vital Signs:   Date/Time  Temp  Pulse  Resp  BP  MAP (mmHg)  SpO2    05/15/21 0830  97 6 °F (36 4 °C)  66  18  134/61  88  94 %    05/15/21 0630  98 1 °F (36 7 °C)  58  16  105/59  --  92 %    05/15/21 0430  98 1 °F (36 7 °C)  66  16  111/54  78  94 %    05/15/21 0230  98 2 °F (36 8 °C)  73  16  104/60  76  95 %    05/15/21 0130  98 2 °F (36 8 °C)  72  16  125/61  86  94 %    05/15/21 0030  98 3 °F (36 8 °C)  76  16  116/69  87  90 %    05/14/21 2330  97 7 °F (36 5 °C)  77  16  134/69  93  94 %    05/14/21 1915  --  74  16  127/58  83  98 %    05/14/21 1900  --  106Abnormal   18  131/73  90  92 %    05/14/21 1845  --  76  18  120/62  84  97 %    05/14/21 1830  --  92  16  130/61  88  97 %    05/14/21 1815  --  84  16  141/87  105  98 %    05/14/21 1800  --  84  16  166/99  --  98 %        Date and Time Eye Opening Best Verbal Response Best Motor Response [place] Coma Scale Score 05/15/21 0800 4 5 6 15   05/15/21 0003 4 5 6 15   05/14/21 2245 4 5 6 15   05/14/21 2210 4 5 6 15   05/14/21 2145 4 5 6 15   05/14/21 2045 4 5 6 15   05/14/21 1945 4 5 6 15   05/14/21 1915 4 5 6 15   05/14/21 1900 4 5 6 15   05/14/21 1845 4 5 6 15   05/14/21 1830 4 5 6 15   05/14/21 1815 4 5 6 15   05/14/21 1800 4 5 6 15   05/14/21 1746 4 5 6 15       Pertinent Labs/Diagnostic Test Results:   5/14   CT brain stroke alert-No acute intracranial abnormality  Microangiopathic changes  CTA head  neck-No significant stenosis of the cervical carotid or vertebral arteries  No significant canal stenosis, vessel occlusion or aneurysm    MRI brain-White matter changes suggestive of chronic microangiopathy  No acute intracranial pathology    ECG-Atrial fibrillation  Right axis deviation  Low voltage QRS  Cannot rule out Anterior infarct , age undetermined    Results from last 7 days   Lab Units 05/14/21  1807   SARS-COV-2  Negative     Results from last 7 days   Lab Units 05/14/21  1807   WBC Thousand/uL 10 55*   HEMOGLOBIN g/dL 16 0*   HEMATOCRIT % 48 3*   PLATELETS Thousands/uL 254         Results from last 7 days   Lab Units 05/14/21 2023   SODIUM mmol/L 134*   POTASSIUM mmol/L 3 9   CHLORIDE mmol/L 102   CO2 mmol/L 23   ANION GAP mmol/L 9   BUN mg/dL 12   CREATININE mg/dL 0 79   EGFR ml/min/1 73sq m 68   CALCIUM mg/dL 8 3         Results from last 7 days   Lab Units 05/14/21  1743   POC GLUCOSE mg/dl 138     Results from last 7 days   Lab Units 05/14/21  2023   GLUCOSE RANDOM mg/dL 112           Results from last 7 days   Lab Units 05/14/21  1807   TROPONIN I ng/mL <0 02         Results from last 7 days   Lab Units 05/14/21  1806   PROTIME seconds 14 2   INR  1 08   PTT seconds 30     Results from last 7 days   Lab Units 05/14/21  2023   TSH 3RD GENERATON uIU/mL 0 905           Results from last 7 days   Lab Units 05/14/21  1830   CLARITY UA  Slightly Cloudy   COLOR UA  Yellow   SPEC GRAV UA  1 015   PH UA  6 0 GLUCOSE UA mg/dl Negative   KETONES UA mg/dl 15 (1+)*   BLOOD UA  Trace-Intact*   PROTEIN UA mg/dl Negative   NITRITE UA  Negative   BILIRUBIN UA  Negative   UROBILINOGEN UA E U /dl 0 2   LEUKOCYTES UA  Trace*   WBC UA /hpf 10-20*   RBC UA /hpf None Seen   BACTERIA UA /hpf Innumerable*   EPITHELIAL CELLS WET PREP /hpf Occasional         ED Treatment:   Medication Administration from 05/14/2021 1718 to 05/14/2021 2318       Date/Time Order Dose Route Action     05/14/2021 1802 iohexol (OMNIPAQUE) 350 MG/ML injection (SINGLE-DOSE) 85 mL 85 mL Intravenous Given     05/14/2021 1856 aspirin tablet 325 mg 325 mg Oral Given     05/14/2021 1919 sodium chloride 0 9 % bolus 1,000 mL 1,000 mL Intravenous New Bag     05/14/2021 2019 atorvastatin (LIPITOR) tablet 40 mg 40 mg Oral Given        Past Medical History:   Diagnosis Date    Atrial fibrillation ([facility] [address] )     Hypertension      Present on Admission:   Essential hypertension   Atrial fibrillation (HCC)      Admitting Diagnosis: Change in mental status [R41 82]  Mental and behavioral problem [F48 9, F69]  Age/Sex: 80 y o  female  Admission Orders:  Scheduled Medications:  apixaban, 5 mg, Oral, BID  aspirin, 81 mg, Oral, Daily  atorvastatin, 40 mg, Oral, QPM      Continuous IV Infusions:     PRN Meds:     Telemetry   Neuro checksEvery 1 hour x 4 hours, then every 2 hours x 8 hours, then every 4 hours x 72 hours   nsg dysphagia assess prior to diet  SCD's      IP CONSULT TO NEUROLOGY  IP CONSULT TO CASE MANAGEMENT  IP CONSULT TO NUTRITION SERVICES    Network Utilization Review Department  ATTENTION: Please call with any questions or concerns to [phone] and carefully listen to the prompts so that you are directed to the right person  All voicemails are confidential   Aubrie Zabala all requests for admission clinical reviews, approved or denied determinations and any other requests to dedicated fax number below belonging to the campus where the patient is receiving treatment  List of dedicated fax numbers for the Facilities:  [address] [place] [address] DENIALS (Administrative/Medical Necessity) [phone]   [address]  (Maternity/NICU/Pediatrics) [phone]   [address] 1941 [address] [place] [address] [address] [facility] [address] [phone]

## 2021-05-15 NOTE — PLAN OF CARE
Problem: Potential for Falls  Goal: Patient will remain free of falls  Description: INTERVENTIONS:  - Assess patient frequently for physical needs  -  Identify cognitive and physical deficits and behaviors that affect risk of falls    -  [place] fall precautions as indicated by assessment   - Educate patient/family on patient safety including physical limitations  - Instruct patient to call for assistance with activity based on assessment  - Modify environment to reduce risk of injury  - Consider OT/PT consult to assist with strengthening/mobility  Outcome: Progressing     Problem: Prexisting or High Potential for Compromised Skin Integrity  Goal: Skin integrity is maintained or improved  Description: INTERVENTIONS:  - Identify patients at risk for skin breakdown  - Assess and monitor skin integrity  - Assess and monitor nutrition and hydration status  - Monitor labs   - Assess for incontinence   - Turn and reposition patient  - Assist with mobility/ambulation  - Relieve pressure over bony prominences  - Avoid friction and shearing  - Provide appropriate hygiene as needed including keeping skin clean and dry  - Evaluate need for skin moisturizer/barrier cream  - Collaborate with interdisciplinary team   - Patient/family teaching  - Consider wound care consult   Outcome: Progressing

## 2021-05-15 NOTE — PLAN OF CARE
Problem: OCCUPATIONAL THERAPY ADULT  Goal: Performs self-care activities at highest level of function for planned discharge setting  See evaluation for individualized goals  Description: Treatment Interventions: ADL retraining, UE strengthening/ROM, Endurance training, Neuromuscular reeducation, Continued evaluation, Activityengagement, Energy conservation          See flowsheet documentation for full assessment, interventions and recommendations  Note: Limitation: Decreased ADL status, Decreased UE strength, Decreased Safe judgement during ADL, Decreased endurance, Decreased self-care trans, Decreased high-level ADLs  Prognosis: Fair  Assessment: Patient evaluated by Occupational Therapy  Patient admitted with Change in mental status  The patients occupational profile, medical and therapy history includes a expanded review of medical and/or therapy records and additional review of physical, cognitive, or psychosocial history related to current functional performance  Patient performed standing grooming at Sup, UB bath Sup, UB dressing Sup, LB dressing Sup and toileting Sup, bed mobility Mos I, transfer Mod I and functional mobility  transfers Mod I  The evaluation identifies the following performance deficits: weakness, impaired balance, decreased endurance and decreased coordination, that result in activity limitations and/or participation restrictions  Comorbidities affecting functional mobility and ADLS include: Afib and hypertension  Prior to admission, patient was independent with functional mobility without assistive device, independent with ADLS, independent with IADLS and living alone in 1 story home with 2 steps to enter    This evaluation requires clinical decision making of moderate complexity, because the patient may present with comorbidities that affect occupational performance and required minimal or moderate modification of tasks or assistance with the consideration of several treatment options  The Barthel Index was used as a functional outcome tool presenting with a score of 80 The patient's raw score on the AM-PAC Daily Activity inpatient short form is 19, standardized score is 40 22, greater than 39 4  Patients at this level are likely to benefit from DC to home  Please refer to the recommendation of the Occupational Therapist for safe DC planning  Patient will benefit from skilled Occupational Therapy services to address above deficits and facilitate a safe return to prior level of function       OT Discharge Recommendation: No rehabilitation needs

## 2021-05-15 NOTE — DISCHARGE INSTR - AVS FIRST PAGE
Dear Padma Chavez,     It was our pleasure to care for you here at [facility], [facility]  It is our hope that we were always able to exceed the expected standards for your care during your stay  You were hospitalized due to altered mental status  You were cared for on the 3rd floor by Sandee Chand MD under the service of Pam Delgado MD with the [facility]ist [facility] who covers for your primary care physician (PCP), Marybeth Herrera MD, while you were hospitalized  If you have any questions or concerns related to this hospitalization, you may contact us at [phone]  For follow up as well as any medication refills, we recommend that you follow up with your primary care physician  A registered nurse will reach out to you by phone within a few days after your discharge to answer any additional questions that you may have after going home  However, at this time we provide for you here, the most important instructions / recommendations at discharge:     · Notable Medication Adjustments -   · Please start taking aspirin 81 mg daily  · Please start taking atorvastatin 40 mg daily  · Testing Required after Discharge -   · None  · Important follow up information -   · Please follow-up with your primary care provider  · Please follow-up with Hematology for evaluation of elevated red blood cell count  · Please follow-up with geriatric Medicine for further evaluation of mental status  · Other Instructions -   · Potential side effects of atorvastatin includes muscle aches  If you experiences symptoms, please contact your primary care provider for further instructions    · Please take extra precautions to avoid falls in the setting of Eliquis and aspirin as this medications have an increased risk for excessive bleeding  ***  · Please review this entire after visit summary as additional general instructions including medication list, appointments, activity, diet, any pertinent wound care, and other additional recommendations from your care team that may be provided for you        Sincerely,     Rahel Huynh MD

## 2021-05-15 NOTE — ASSESSMENT & PLAN NOTE
· With notable hemoglobin level 16  · Chart review, with similar level back in 2020  · Referral to outpatient Hematology for further outpatient workup

## 2021-05-15 NOTE — PHYSICAL THERAPY NOTE
PHYSICAL THERAPY EVALUATION  NAME: Citlalli Zhu  AGE:   80 y o  MRN:  [id number]  ADMIT DX: Change in mental status [R41 82]  Mental and behavioral problem [F48 9, F69]    PMH:   Past Medical History:   Diagnosis Date    Atrial fibrillation ([facility] [address] )     Hypertension      LENGTH OF STAY: 0       05/15/21 0948   PT Last Visit   PT Visit Date 05/15/21   Note Type   Note type Evaluation   Pain Assessment   Pain Assessment Tool 0-10   Pain Score No Pain   Home Living   Type of [facility] [address] One level;Stairs to enter with rails; Laundry in basement  (2 ERICKA from garage)   Bathroom Shower/Tub Walk-in shower   [address] [facility] [address] chair;Grab bars in [facility] Walker;Cane   Additional Comments Ambulates independently without AD at baseline  Prior Function   Level of [place] Independent with ADLs and functional mobility   Lives With Alone   Receives Help From Family; Other (Comment)  (daughter stops by daily)   ADL Assistance Independent   IADLs Independent  ((+) )   Falls in the last 6 months 0   Restrictions/Precautions   Weight Bearing Precautions Per Order No   Other Precautions Chair Alarm;Cognitive; Impulsive; Fall Risk;Hard of hearing   General   Additional Pertinent History per MRI: White matter changes suggestive of chronic microangiopathy  No acute intracranial pathology   Family/Caregiver Present No   Cognition   Overall Cognitive Status Impaired  (impulsive/[place]? )   Arousal/Participation Cooperative   Orientation Level Oriented to person;Oriented to place;Oriented to time;Oriented to situation   Memory Decreased recall of precautions   Following Commands Follows one step commands with increased time or repetition   Comments Pt identified by name and       RLE Assessment   RLE Assessment X   Strength RLE   R Hip Flexion 4-/5   R Knee Extension 4-/5   R Ankle Dorsiflexion 4/5   R Ankle Plantar Flexion 4/5   LLE Assessment   LLE Assessment X   Strength LLE   L Hip Flexion 4-/5   L Knee Extension 4-/5   L Ankle Dorsiflexion 4/5   L Ankle Plantar Flexion 4/5   Coordination   Movements are Fluid and Coordinated 0   Coordination and Movement Description LE dysdiadochokinesia   Sensation WFL   Light Touch   RLE Light Touch Grossly intact   LLE Light Touch Grossly intact   Bed Mobility   Supine to Sit 6  Modified independent   Additional items HOB elevated; Bedrails; Increased time required   Sit to Supine Unable to assess  (left OOB in chair post session with alarm intact)   Transfers   Sit to Stand 6  Modified independent   Additional items Impulsive   Stand to Sit 6  Modified independent   Additional items Impulsive   Ambulation/Elevation   Gait pattern Improper Weight shift;Decreased R stance;Decreased foot clearance; Short stride; Excessively slow  (1 small LOB; self-corrected)   Gait Assistance 5  Supervision   Additional items Verbal cues   Assistive Device None   Distance 120` x1   Stair Management Assistance 5  Supervision   Additional items Verbal cues   Stair Management Technique One rail L;Other (Comment)  (on step stool in room)   Number of Stairs 2   Balance   Static Sitting Good   Dynamic Sitting Fair +   Static Standing Fair +   Dynamic Standing Fair   Ambulatory Fair   Endurance Deficit   Endurance Deficit Yes   Endurance Deficit Description gait degradation with increased distance   Activity Tolerance   Activity Tolerance Patient limited by fatigue;Patient tolerated treatment well   Nurse Made Aware Per RN, pt appropriate to evaluate   Assessment   Prognosis Good   Problem List Decreased strength;Decreased endurance; Impaired balance;Decreased mobility; Decreased coordination;Decreased safety awareness; Impaired hearing   Goals   Patient Goals to go home    STG Expiration Date 05/25/21   Short Term Goal #1 Pt will be able to: (1) ambulate at least 200` with mod I and least restrictive AD to increase activity tolerance and allow for safe community mobilization (2) increase standing balance by 1 grade to decrease risk of falls (3) negotiate at least 2 stairs with mod I and use of L handrail to allow safe access into home    PT Treatment Day 0   Plan   Treatment/Interventions LE strengthening/ROM; Elevations; Therapeutic exercise; Endurance training;Patient/family training;Equipment eval/education; Bed mobility;Gait training   PT Frequency Other (Comment)  (1-2 follow up sessions )   Recommendation   PT Discharge Recommendation No rehabilitation needs   AM-PAC Basic Mobility Inpatient   Turning in Bed Without Bedrails 4   Lying on Back to Sitting on Edge of Flat Bed 4   Moving Bed to Chair 3   Standing Up From Chair 4   Walk in Room 3   Climb 3-5 Stairs 3   Basic Mobility Inpatient Raw Score 21   Basic Mobility Standardized Score 45 55   Barthel Index   Feeding 10   Bathing 5   Grooming Score 5   Dressing Score 5   Bladder Score 10   Bowels Score 10   Toilet Use Score 5   Transfers (Bed/Chair) Score 15   Mobility (Level Surface) Score 10   Stairs Score 5   Barthel Index Score 80   The patient's AM-PAC Basic Mobility Inpatient Short Form Raw Score is 21, Standardized Score is 45 55  A standardized score greater than 42 9 suggests the patient may benefit from discharge to home  Please also refer to the recommendation of the Physical Therapist for safe discharge planning  Assessment: Pt is a 80 y o  female seen for PT evaluation s/p admit to [address] [place] [address] on 5/14/2021 w/ Change in mental status  Order placed for PT  Comorbidities affecting pt's physical performance at time of assessment are listed above  Personal factors affecting pt at time of IE include: steps to enter environment, limited home support, advanced age and limited insight into impairments  Prior to admission, pt was was independent w/ all functional mobility w/ out AD , lived in multi-level home, had 2 ERICKA (+) railing and lives alone   Upon evaluation: Pt requires mod I for bed mobility, mod I for sit to stand, supervision for ambulation, and supervision for stair negotiation with rail  (Please find full objective findings from PT assessment regarding body systems outlined above)  Impairments and limitations also listed above, especially due to  weakness, impaired balance, decreased endurance, impaired coordination, gait deviations, decreased activity tolerance, decreased safety awareness, impaired judgement and fall risk  The following objective measures performed on IE also reveal limitations: Barthel Index 80/100  Pt's clinical presentation is currently unstable/unpredictable seen in pt's presentation of decreased safety awareness, fall risk, and limited insight into deficits  Pt to benefit from 1-2 follow up sessions of skilled PT tx while in hospital and upon DC to address deficits as defined above and maximize level of functional mobility  From PT/mobility standpoint, recommendation at time of d/c would be home with family support pending progress in order to maximize pt's functional independence and consistency w/ mobility in order to facilitate return to PLOF  Recommend progression of ambulation and stair negotiation as appropriate        Mortimer Public, PT,DPT

## 2021-05-15 NOTE — PLAN OF CARE
Problem: PHYSICAL THERAPY ADULT  Goal: Performs mobility at highest level of function for planned discharge setting  See evaluation for individualized goals  Description: Treatment/Interventions: LE strengthening/ROM, Elevations, Therapeutic exercise, Endurance training, Patient/family training, Equipment eval/education, Bed mobility, Gait training          See flowsheet documentation for full assessment, interventions and recommendations  Note: Prognosis: Good  Problem List: Decreased strength, Decreased endurance, Impaired balance, Decreased mobility, Decreased coordination, Decreased safety awareness, Impaired hearing  Assessment: Pt is a 80 y o  female seen for PT evaluation s/p admit to [address] [place] [address] on 5/14/2021 w/ Change in mental status  Order placed for PT  Comorbidities affecting pt's physical performance at time of assessment are listed above  Personal factors affecting pt at time of IE include: steps to enter environment, limited home support, advanced age and limited insight into impairments  Prior to admission, pt was was independent w/ all functional mobility w/ out AD , lived in multi-level home, had 2 ERICKA (+) railing and lives alone  Upon evaluation: Pt requires mod I for bed mobility, mod I for sit to stand, supervision for ambulation, and supervision for stair negotiation with rail  (Please find full objective findings from PT assessment regarding body systems outlined above)  Impairments and limitations also listed above, especially due to  weakness, impaired balance, decreased endurance, impaired coordination, gait deviations, decreased activity tolerance, decreased safety awareness, impaired judgement and fall risk  The following objective measures performed on IE also reveal limitations: Barthel Index 80/100  Pt's clinical presentation is currently unstable/unpredictable seen in pt's presentation of decreased safety awareness, fall risk, and limited insight into deficits    Pt to benefit from 1-2 follow up sessions of skilled PT tx while in hospital and upon DC to address deficits as defined above and maximize level of functional mobility  From PT/mobility standpoint, recommendation at time of d/c would be home with family support pending progress in order to maximize pt's functional independence and consistency w/ mobility in order to facilitate return to PLOF  Recommend progression of ambulation and stair negotiation as appropriate  PT Discharge Recommendation: No rehabilitation needs          See flowsheet documentation for full assessment

## 2021-05-15 NOTE — ASSESSMENT & PLAN NOTE
· UA positive for bacteria, WBC  Negative for nitrites  · Patient denies any urinary symptoms  · She is afebrile with mild leukocytosis  · Monitor off antibiotics

## 2021-05-15 NOTE — SPEECH THERAPY NOTE
Speech-Language Pathology Bedside Swallow Evaluation        Patient Name: Gracie Page    VMNXV'[id number] Date: 5/15/2021     Problem List  Principal Problem:    Change in mental status  Active Problems:    Atrial fibrillation [facility])    Essential hypertension    Polycythemia         Past Medical History  Past Medical History:   Diagnosis Date    Atrial fibrillation ([facility] [address] )     Hypertension        Past Surgical History  Past Surgical History:   Procedure Laterality Date    CHOLECYSTECTOMY         Summary    Oral and pharyngeal stages of swallowing appeared WNL, no overt s/s aspiration or c/o dysphagia symptoms  Per nsg, pt took meds w/ water w/o difficulty  Recommendations:   Diet: regular diet and thin liquids   Meds: whole with liquid   Frequent Oral care: 2x/day  Other Recommendations/ considerations: No follow up tx needed  Current Medical Status  Pt is a 80 y o  female who presented to [address] [place] [address]  with change in MS; sudden onset of confusion  The patient states that she did not feel right and called her daughter who found her to be acutely confused and making no sense over the phone   Jackie Shells is hemodynamically stable in emergency department rates are controlled in the 90s, blood pressure is well controlled currently 127/58    Stroke alert was called and the decision was made not given any tPA at this time  Neurology are also recommending that we hold anticoagulation for now  Pt reportedly had episode of aphasia in the ED, but symptoms are currently resolved  Past medical history:   Please see H&P for details    Special Studies:  MRI: 5/14/21 White matter changes suggestive of chronic microangiopathy  No acute intracranial pathology      Social/Education/Vocational Hx:  Pt lives alone    Swallow Information   Current Risks for Dysphagia & Aspiration: CVA and AMS  Current Symptoms/Concerns: altered mental status  Current Diet: regular diet and thin liquids   Baseline Diet: regular diet and thin liquids  Takes pills- whole w/ water   Baseline Assessment   Behavior/Cognition: alert  Speech/Language Status: able to participate in conversation and able to follow commands  Patient Positioning: upright in chair     Swallow Mechanism Exam   Facial: symmetrical  Labial: WFL  Lingual: decreased ROM  Velum: unable to visualize  Mandible: adequate ROM  Dentition: adequate  Vocal quality:clear/adequate   Volitional Cough: strong/productive   Respiratory: RA     Consistencies Assessed and Performance   Consistencies Administered: pt seen at breakfast with few bites of [place] toast, scrambled eggs, rice krispies, OJ and hot tea  Oral Stage: pt w/ adequate bolus retrieval; mastication, manipulation and transfer were [place]  Pt w/ good oral control of liquids  No pocketing or oral residue noted     Pharyngeal Stage: swallows were timely and complete w/ no overt s/s aspiration noted         Esophageal Concerns: none reported      Results Reviewed with: patient   Dysphagia Goals: none at this time    April Brandon Little CCC-SLP  Speech Pathologist  [place] license # [id number] [facility] [id number]  [place] license # [id number]  Available via [employer]

## 2021-05-15 NOTE — DISCHARGE SUMMARY
[facility]  Discharge- Basil Exon 1935, 80 y o  female MRN: [id number]  Unit/Bed#: S -01 Encounter: [id number]  Primary Care Provider: Tomas Valentino MD   Date and time admitted to hospital: 5/14/2021  5:35 PM    * Change in mental status  Assessment & Plan  · Presenting with headache, confusion  With notable neuro deficits on presentation  · NIH SS 4 on presentation  Stroke alert called in the ED  · Decision made not to give tPA due to anticoagulation status  · Was admitted under the stroke pathway  · Normotensive on presentation  · With no notable electrolyte abnormalities  · TSH normal  · With no evidence of acute infection  · , otherwise rest of the lipid panel normal  · CTA with no significant vascular stenosis  · MRI with no evidence of acute stroke  · Will restart Eliquis  · Etiology of acute confusion unclear  Differential diagnosis include TIA (although currently anticoagulated) versus migraine versus seizures  · Continue aspirin and statin   · Appreciate neurology recommendations, outpatient follow-up  Outpatient EEG  · Concerns for dementia, outpatient referral to Geriatrics  Discussed with daughter  Asymptomatic bacteriuria  Assessment & Plan  · UA positive for bacteria, WBC  Negative for nitrites  · Patient denies any urinary symptoms  · She is afebrile with mild leukocytosis  · Monitor off antibiotics  Polycythemia  Assessment & Plan  · With notable hemoglobin level 16  · Chart review, with similar level back in 2020  · Referral to outpatient Hematology for further outpatient workup  Essential hypertension  Assessment & Plan  · Blood pressure stable  · Restart home antihypertensive medications, no evidence of acute stroke on MRI  · Continue to monitor blood pressure  Atrial fibrillation (HCC)  Assessment & Plan  · Afib on AC  · Follows up with outpatient Cardiology    · Not on a beta-blocker  · With notable physiologic bradycardia during sleep  Heart rate would drop to 30s to 40s range while asleep  · Heart rate reviewed during the daytime when patient is awake, heart rate in the 70s range  Discharging Resident Physician: Pedro Junior MD  Attending: Tanner Goodrich MD  PCP: Latrice Kebede MD  Admission Date: 5/14/2021  Discharge Date: 05/15/21    Disposition:     Home    Reason for Admission:  Stroke rule out    Consultations During Hospital Stay:  · Neurology    Procedures Performed:     · None    Significant Findings / Test Results:       Mri Brain Wo Contrast  Result Date: 5/15/2021  Impression: White matter changes suggestive of chronic microangiopathy  No acute intracranial pathology  Ct Stroke Alert Brain    Result Date: 5/14/2021  Impression: No acute intracranial abnormality  Microangiopathic changes  Cta Stroke Alert (head/neck)    Result Date: 5/14/2021  Impression: No significant stenosis of the cervical carotid or vertebral arteries No significant canal stenosis, vessel occlusion or aneurysm  Incidental Findings:   · None     Test Results Pending at Discharge (will require follow up): · None     Outpatient Tests Requested:  · None    Complications:  None    Hospital Course:     Dina Victor is a 80 y o  female with a history of atrial fibrillation and hypertension who originally presented to the hospital on 5/14/2021 due to headache and confusion  Was noted to be hemodynamically stable in the emergency department with heart rates in the 90s with normal blood pressure  The time of admission, patient was noted to have neurological deficits with an NIH SS of 4  A stroke alert was called and decision was made not to give tPA as patient was on Eliquis for anticoagulation  She was started on aspirin and statin and admitted under the stroke pathway  CTA head and neck demonstrated no acute intracranial abnormality no significant vascular stenosis or vessel occlusion    MRI did not show any acute intracranial pathology  She currently has no neurological deficits  She was seen by Neurology who recommended outpatient follow-up and outpatient EEG  They are also concerned that patient may be exhibiting signs of dementia  Concerns discussed with daughter, patient will follow-up outpatient with Geriatric Medicine  Patient also noted to have an elevated hemoglobin level, with a similar level noted in 2020  Recommended outpatient follow-up with Hematology for further workup  Unclear etiology for patient's acute change in mental status  Differential diagnosis included TIA versus dehydration versus migraine versus seizure    Condition at Discharge: good     Discharge Day Visit / Exam:     Subjective:  Patient was seen this morning while in bed  She denies any in any headaches, confusion, numbness, tingling, weakness or change in speech  She reports that she feels lot better and that she is her normal self  Vitals: Blood Pressure: 130/79 (05/15/21 1200)  Pulse: 75 (05/15/21 1200)  Temperature: 97 5 °F (36 4 °C) (05/15/21 1200)  Temp Source: Oral (05/15/21 1200)  Respirations: 16 (05/15/21 1200)  Height: 5' 3" (160 cm) (05/14/21 2330)  Weight - Scale: 75 kg (165 lb 5 5 oz) (05/15/21 0600)  SpO2: 95 % (05/15/21 1200)  Exam:   Physical Exam  Vitals signs and nursing note reviewed  Constitutional:       General: She is not in acute distress  Appearance: She is well-developed  She is not ill-appearing or diaphoretic  HENT:      Head: Normocephalic and atraumatic  Mouth/Throat:      Mouth: Mucous membranes are moist    Eyes:      Extraocular Movements: Extraocular movements intact  Conjunctiva/sclera: Conjunctivae normal       Pupils: Pupils are equal, round, and reactive to light  Cardiovascular:      Rate and Rhythm: Normal rate  Rhythm irregular  Heart sounds: Normal heart sounds  No murmur     Pulmonary:      Effort: Pulmonary effort is normal       Breath sounds: Normal breath sounds  No wheezing, rhonchi or rales  Abdominal:      General: Bowel sounds are normal  There is no distension  Palpations: Abdomen is soft  Tenderness: There is no abdominal tenderness  Musculoskeletal:      Right lower leg: No edema  Left lower leg: No edema  Skin:     General: Skin is warm and dry  Neurological:      General: No focal deficit present  Mental Status: She is alert and oriented to person, place, and time  Mental status is at baseline  Cranial Nerves: No cranial nerve deficit  Sensory: No sensory deficit  Motor: No weakness  Psychiatric:         Mood and Affect: Mood normal          Behavior: Behavior normal          Discussion with Family:  Discussed with daughter at bedside  Discussed the need to follow-up with geriatric Medicine for evaluation of dementia especially with patient living alone  Also discussed any to follow-up with hematology for further evaluation of polycythemia  Discussed the potential side effect of atorvastatin including myalgias and to reach out to primary care physician should patient experience this  Also discussed the need to follow up with Neurology outpatient  Discharge instructions/Information to patient and family:   See after visit summary for information provided to patient and family  Provisions for Follow-Up Care:  See after visit summary for information related to follow-up care and any pertinent home health orders  Planned Readmission:  None     Discharge Medications:  See after visit summary for reconciled discharge medications provided to patient and family        ** Please Note: This note has been constructed using a voice recognition system **

## 2021-05-15 NOTE — H&P
[facility]  H&P- Osmar Chapa 1935, 80 y o  female MRN: [id number]  Unit/Bed#: FT 03 Encounter: [id number]  Primary Care Provider: Meera Strickland MD   Date and time admitted to hospital: 5/14/2021  5:35 PM    * Change in mental status  Assessment & Plan  Expressive and receptive aphasia  TIA/stroke pathway   No tPA as per neurology   Hold eliquis as per neurology       Essential hypertension  Assessment & Plan  Hold home HTN meds  Await MRI  Atrial fibrillation (HCC)  Assessment & Plan  Afib on AC  Will monitor on telemetry  VTE Prophylaxis: Heparin  / sequential compression device   Code Status: full code  POLST: There is no POLST form on file for this patient (pre-hospital)  Discussion with family: Discussed with daughter at bedside  Anticipated Length of Stay:  Patient will be admitted on an Observation basis with an anticipated length of stay of  Less than 2 midnights  Justification for Hospital Stay: stroke tia  Total Time for Visit, including Counseling / Coordination of Care: 45 minutes  Greater than 50% of this total time spent on direct patient counseling and coordination of care  Chief Complaint:   Altered mental status  History of Present Illness:    Osmar Chapa is a 80 y o  female with AFib and hypertension who presents with sudden-onset confusion this morning  The patient states that she did not feel right and called her daughter who found her to be acutely confused and making no sense over the phone  She is hemodynamically stable in emergency department rates are controlled in the 90s, blood pressure is well controlled currently 127/58  Stroke alert was called and the decision was made not given any tPA at this time  Neurology are also recommending that we hold anticoagulation for now  Will admit stroke pathway  Review of Systems:    Review of Systems   Constitutional: Negative  HENT: Negative      Eyes: Negative  Respiratory: Negative  Cardiovascular: Negative  Gastrointestinal: Negative  Endocrine: Negative  Genitourinary: Negative  Musculoskeletal: Negative  Skin: Negative  Allergic/Immunologic: Negative  Neurological: Negative  Hematological: Negative  Psychiatric/Behavioral: Negative  Past Medical and Surgical History:     Past Medical History:   Diagnosis Date    Atrial fibrillation ([facility] [address] )     Hypertension        Past Surgical History:   Procedure Laterality Date    CHOLECYSTECTOMY         Meds/Allergies:    Prior to Admission medications    Medication Sig Start Date End Date Taking? Authorizing Provider   amLODIPine-benazepril (LOTREL 5-20) 5-20 MG per capsule Take 1 capsule by mouth daily 8/12/20  Yes Historical Provider, MD   Eliquis 5 MG  9/8/20  Yes Historical Provider, MD   Klor-Con M20 20 MEQ tablet Take 20 mEq by mouth daily 8/12/20  Yes Historical Provider, MD   Multiple Vitamins-Minerals (PRESERVISION AREDS 2 PO) Take by mouth    Historical Provider, MD     I have reviewed home medications with patient personally  Allergies: Allergies   Allergen Reactions    Bactrim [Sulfamethoxazole-Trimethoprim]        Social History:     Marital Status:     Occupation:  Retired  Patient Pre-hospital Living Situation:  Lives alone  Patient Pre-hospital Level of Mobility:  Fully mobile  Patient Pre-hospital Diet Restrictions:  None  Substance Use History:   Social History     Substance and Sexual Activity   Alcohol Use Not Currently     Social History     Tobacco Use   Smoking Status Never Smoker   Smokeless Tobacco Never Used     Social History     Substance and Sexual Activity   Drug Use Not Currently       Family History:    Family History   Problem Relation Age of Onset    Hypertension Mother     Hyperlipidemia Neg Hx     Heart failure Neg Hx     Heart disease Neg Hx     Heart attack Neg Hx     Arrhythmia Neg Hx     Asthma Neg Hx     Fainting Neg Hx  Clotting disorder Neg Hx     Anemia Neg Hx        Physical Exam:     Vitals:   Blood Pressure: 127/58 (05/14/21 1915)  Pulse: 74 (05/14/21 1915)  Temperature: 97 8 °F (36 6 °C) (05/14/21 1743)  Temp Source: Temporal (05/14/21 1743)  Respirations: 16 (05/14/21 1915)  Height: 5' 5" (165 1 cm) (05/14/21 1739)  Weight - Scale: 75 kg (165 lb 5 5 oz) (05/14/21 1832)  SpO2: 98 % (05/14/21 1915)    Physical Exam  Constitutional:       General: She is not in acute distress  Appearance: She is not ill-appearing, toxic-appearing or diaphoretic  HENT:      Head: Normocephalic  Nose: Nose normal       Mouth/Throat:      Mouth: Mucous membranes are moist       Pharynx: No oropharyngeal exudate or posterior oropharyngeal erythema  Eyes:      General: No scleral icterus  Right eye: No discharge  Left eye: No discharge  Pupils: Pupils are equal, round, and reactive to light  Neck:      Musculoskeletal: No neck rigidity or muscular tenderness  Vascular: No carotid bruit  Cardiovascular:      Rate and Rhythm: Normal rate  Rhythm irregular  Heart sounds: Murmur present  No friction rub  No gallop  Pulmonary:      Effort: No respiratory distress  Breath sounds: No stridor  No wheezing, rhonchi or rales  Abdominal:      General: Abdomen is flat  There is no distension  Palpations: There is no mass  Tenderness: There is no abdominal tenderness  There is no left CVA tenderness, guarding or rebound  Hernia: No hernia is present  Musculoskeletal:         General: No swelling, tenderness, deformity or signs of injury  Right lower leg: No edema  Left lower leg: No edema  Lymphadenopathy:      Cervical: No cervical adenopathy  Skin:     Capillary Refill: Capillary refill takes less than 2 seconds  Coloration: Skin is pale  Skin is not jaundiced  Findings: No bruising, erythema, lesion or rash     Neurological:      General: No focal deficit present  Mental Status: She is alert  Cranial Nerves: No cranial nerve deficit  Sensory: No sensory deficit  Motor: No weakness  Coordination: Coordination normal       Deep Tendon Reflexes: Reflexes normal       Comments: Answering some questions appropriately but other times having word-finding difficulties and not understanding the question  Appears like expressive and receptive aphasia  Moving all 4 extremities symmetrical   Psychiatric:         Mood and Affect: Mood normal            Additional Data:     Lab Results: I have personally reviewed pertinent reports  Results from last 7 days   Lab Units 05/14/21  1807   WBC Thousand/uL 10 55*   HEMOGLOBIN g/dL 16 0*   HEMATOCRIT % 48 3*   PLATELETS Thousands/uL 254         Results from last 7 days   Lab Units 05/14/21  1806   INR  1 08     Results from last 7 days   Lab Units 05/14/21  1743   POC GLUCOSE mg/dl 138               Imaging: I have personally reviewed pertinent reports  CTA stroke alert (head/neck)   Final Result by Lupe Cantu MD (05/14 1817)      No significant stenosis of the cervical carotid or vertebral arteries   No significant canal stenosis, vessel occlusion or aneurysm  Findings were directly discussed with Amaya Sullivan on 5/14/2021 6:15 PM                      Workstation performed: [id number]         CT stroke alert brain   Final Result by Lupe Cantu MD (05/14 1817)      No acute intracranial abnormality  Microangiopathic changes  Findings were directly discussed with Amaya Sullivan on 5/14/2021 6:15 PM       Workstation performed: [id number]         MRI Inpatient Order    (Results Pending)       EKG, Pathology, and Other Studies Reviewed on Admission:   · EKG:  No EKG on the chart    Allscripts / Epic Records Reviewed: Yes     ** Please Note: This note has been constructed using a voice recognition system   **

## 2021-05-15 NOTE — CONSULTS
Consult received and appreciated  Pt currently with good po intakes, potential to continue with same  Discussed diet hx with pt, daughter present  Pt shared she does not follow any dietary patterns or regimens  Encouraged low fat foods, as well as fruits, vegetables, and whole grains and the benefits of incorporating these foods  Pt expressed understanding and willing to try incorporating low fat foods into eating habits  No additional questions  Will educate PRN      Thank you,   Nico Gibbs MA, RD, LDN

## 2021-05-15 NOTE — PLAN OF CARE
Problem: PHYSICAL THERAPY ADULT  Goal: Performs mobility at highest level of function for planned discharge setting  See evaluation for individualized goals  Description: Treatment/Interventions: LE strengthening/ROM, Elevations, Therapeutic exercise, Endurance training, Patient/family training, Equipment eval/education, Bed mobility, Gait training          See flowsheet documentation for full assessment, interventions and recommendations  5/15/2021 1031 by Verner Dollar, PT  Note: Prognosis: Good  Problem List: Decreased strength, Decreased endurance, Impaired balance, Decreased mobility, Decreased coordination, Decreased safety awareness, Impaired hearing  Assessment: Pt is a 80 y o  female seen for PT evaluation s/p admit to [address] [place] [address] on 5/14/2021 w/ Change in mental status  Order placed for PT  Comorbidities affecting pt's physical performance at time of assessment are listed above  Personal factors affecting pt at time of IE include: steps to enter environment, limited home support, advanced age and limited insight into impairments  Prior to admission, pt was was independent w/ all functional mobility w/ out AD , lived in multi-level home, had 2 ERICKA (+) railing and lives alone  Upon evaluation: Pt requires mod I for bed mobility, mod I for sit to stand, supervision for ambulation, and supervision for stair negotiation with rail  (Please find full objective findings from PT assessment regarding body systems outlined above)  Impairments and limitations also listed above, especially due to  weakness, impaired balance, decreased endurance, impaired coordination, gait deviations, decreased activity tolerance, decreased safety awareness, impaired judgement and fall risk  The following objective measures performed on IE also reveal limitations: Barthel Index 80/100   Pt's clinical presentation is currently unstable/unpredictable seen in pt's presentation of decreased safety awareness, fall risk, and limited insight into deficits  Pt to benefit from 1-2 follow up sessions of skilled PT tx while in hospital and upon DC to address deficits as defined above and maximize level of functional mobility  From PT/mobility standpoint, recommendation at time of d/c would be home with family support pending progress in order to maximize pt's functional independence and consistency w/ mobility in order to facilitate return to PLOF  Recommend progression of ambulation and stair negotiation as appropriate  PT Discharge Recommendation: No rehabilitation needs          See flowsheet documentation for full assessment  5/15/2021 1028 by Ruby Lester PT  Note: Prognosis: Good  Problem List: Decreased strength, Decreased endurance, Impaired balance, Decreased mobility, Decreased coordination, Decreased safety awareness, Impaired hearing  Assessment: Pt is a 80 y o  female seen for PT evaluation s/p admit to [address] [place] [address] on 5/14/2021 w/ Change in mental status  Order placed for PT  Comorbidities affecting pt's physical performance at time of assessment are listed above  Personal factors affecting pt at time of IE include: steps to enter environment, limited home support, advanced age and limited insight into impairments  Prior to admission, pt was was independent w/ all functional mobility w/ out AD , lived in multi-level home, had 2 ERICKA (+) railing and lives alone  Upon evaluation: Pt requires mod I for bed mobility, mod I for sit to stand, supervision for ambulation, and supervision for stair negotiation with rail  (Please find full objective findings from PT assessment regarding body systems outlined above)  Impairments and limitations also listed above, especially due to  weakness, impaired balance, decreased endurance, impaired coordination, gait deviations, decreased activity tolerance, decreased safety awareness, impaired judgement and fall risk   The following objective measures performed on IE also reveal limitations: Barthel Index 80/100  Pt's clinical presentation is currently unstable/unpredictable seen in pt's presentation of decreased safety awareness, fall risk, and limited insight into deficits  Pt to benefit from 1-2 follow up sessions of skilled PT tx while in hospital and upon DC to address deficits as defined above and maximize level of functional mobility  From PT/mobility standpoint, recommendation at time of d/c would be home with family support pending progress in order to maximize pt's functional independence and consistency w/ mobility in order to facilitate return to PLOF  Recommend progression of ambulation and stair negotiation as appropriate  PT Discharge Recommendation: No rehabilitation needs          See flowsheet documentation for full assessment

## 2021-05-15 NOTE — ASSESSMENT & PLAN NOTE
· Blood pressure stable  · Restart home antihypertensive medications, no evidence of acute stroke on MRI  · Continue to monitor blood pressure

## 2021-05-15 NOTE — OCCUPATIONAL THERAPY NOTE
Occupational Therapy Evaluation     Patient Name: Gaby Coronel  HRQPA'E Date: 5/15/2021  Problem List  Principal Problem:    Change in mental status  Active Problems:    Atrial fibrillation [facility])    Essential hypertension    Past Medical History  Past Medical History:   Diagnosis Date    Atrial fibrillation ([address] )     Hypertension      Past Surgical History  Past Surgical History:   Procedure Laterality Date    CHOLECYSTECTOMY               05/15/21 0955   OT Last Visit   OT Visit Date 05/15/21   Note Type   Note type Evaluation   Restrictions/Precautions   Weight Bearing Precautions Per Order No   Other Precautions Chair Alarm; Bed Alarm;Hard of hearing;Cognitive   Pain Assessment   Pain Assessment Tool 0-10   Pain Score No Pain   Home Living   Type of [address] [place] [address] One level;Performs ADLs on one level  (2 ERICKA 1 flight to basement)   Bathroom Shower/Tub Walk-in shower   Bathroom Toilet Standard   Bathroom Equipment Shower chair;Grab bars in shower   Home Equipment Walker;Cane   Additional Comments Pt stated ambulated w/o AD   Prior Function   Level of [place] Independent with ADLs and functional mobility   Lives With Alone   Receives Help From Family   ADL Assistance Independent   IADLs Independent   Falls in the last 6 months 0   Comments Pt stated continues to drive  Patient has daughter that lives nearby and can assist if needed  ADL   Eating Assistance 7  Independent   Grooming Assistance 5  Supervision/Setup   Grooming Deficit Wash/dry hands; Wash/dry face   UB Bathing Assistance 5  Supervision/Setup   UB Bathing Deficit Increased time to complete;Right arm;Left arm   LB Bathing Assistance Unable to assess   UB Dressing Assistance 5  Supervision/Setup   UB Dressing Deficit Pull around back; Increased time to complete   LB Dressing Assistance 5  Supervision/Setup   LB Dressing Deficit Don/doff R sock; Don/doff L sock   Toileting Assistance  5  Supervision/Setup   Bed Mobility   Supine to Sit 6  Modified independent   Additional items HOB elevated; Increased time required; Impulsive   Transfers   Sit to Stand 3  Moderate assistance   Additional items Impulsive   Stand to Sit 6  Modified independent   Additional items Impulsive   Functional Mobility   Functional Mobility 5  Supervision   Additional Comments Pt ambulated 10 feet at Sup no AD   Balance   Static Sitting Good   Dynamic Sitting Fair +   Static Standing Fair +   Dynamic Standing Fair   Ambulatory Fair   Activity Tolerance   Activity Tolerance Patient tolerated treatment well   Nurse Made Aware ASHLEY Mata   RUE Assessment   RUE Assessment WFL   RUE Strength   RUE Overall Strength   (/)   LUE Assessment   LUE Assessment WFL   LUE Strength   LUE Overall Strength   (3/5)   Hand Function   Gross Motor Coordination Functional   Fine Motor Coordination Functional   Vision-Basic Assessment   Current Vision Wears glasses all the time   Vision - Complex Assessment   Acuity Able to read clock/calendar on wall without difficulty   Cognition   Orientation Level Oriented to person;Oriented to place;Oriented to time   Following Commands Follows one step commands with increased time or repetition   Comments Pt ID by wristband name and    Assessment   Limitation Decreased ADL status; Decreased UE strength;Decreased Safe judgement during ADL;Decreased endurance;Decreased self-care trans;Decreased high-level ADLs   Prognosis Fair   Assessment Patient evaluated by Occupational Therapy  Patient admitted with Change in mental status  The patients occupational profile, medical and therapy history includes a expanded review of medical and/or therapy records and additional review of physical, cognitive, or psychosocial history related to current functional performance  Patient performed standing grooming at Sup, UB bath Sup, UB dressing Sup, LB dressing Sup and toileting Sup, bed mobility Mos I, transfer Mod I and functional mobility  transfers Mod I  The evaluation identifies the following performance deficits: weakness, impaired balance, decreased endurance and decreased coordination, that result in activity limitations and/or participation restrictions  Comorbidities affecting functional mobility and ADLS include: Afib and hypertension  Prior to admission, patient was independent with functional mobility without assistive device, independent with ADLS, independent with IADLS and living alone in 1 story home with 2 steps to enter  This evaluation requires clinical decision making of moderate complexity, because the patient may present with comorbidities that affect occupational performance and required minimal or moderate modification of tasks or assistance with the consideration of several treatment options  The Barthel Index was used as a functional outcome tool presenting with a score of 80 The patient's raw score on the AM-PAC Daily Activity inpatient short form is 19, standardized score is 40 22, greater than 39 4  Patients at this level are likely to benefit from DC to home  Please refer to the recommendation of the Occupational Therapist for safe DC planning  Patient will benefit from skilled Occupational Therapy services to address above deficits and facilitate a safe return to prior level of function  Goals   Patient Goals "go home   LTG Time Frame   (1-7)   Long Term Goal #1 Patient will increase standing tolerance to 10 minutes during ADL task to decrease assistance level and decrease fall risk; Patient will increase bed mobility to independent in preparation for ADLS and transfers;  Patient will increase functional mobility to and from bathroom with no assistive device independently to increase performance with ADLS and to use a toilet; Patient will tolerate 10 minutes of UE ROM/strengthening to increase general activity tolerance and performance in ADLS/IADLS; Patient will improve functional activity tolerance to 10 minutes of sustained functional tasks to increase participation in basic self-care and decrease assistance level; Patient will increase dynamic standing balance to fair+ to improve postural stability and decrease fall risk during standing ADLS and transfers  Functional Transfer Goals   Pt Will Transfer To Bedside Commode Independently   Pt Will Transfer To Toilet Independently   Pt Will Transfer To Shower Independently   ADL Goals   Pt Will Perform Eating Independently   Pt Will Perform Grooming Independently   Pt Will Perform Bathing Independently   Pt Will Perform UE Dressing Independently   Pt Will Perform LE Dressing Independently   Pt Will Perform Toileting Independently   Plan   Treatment Interventions ADL retraining;UE strengthening/ROM; Endurance training;Neuromuscular reeducation;Continued evaluation; Activityengagement; Energy conservation   Goal Expiration Date 05/22/21   OT Frequency 1-2x/wk   Recommendation   OT Discharge Recommendation No rehabilitation needs   AM-PAC Daily Activity Inpatient   Lower Body Dressing 3   Bathing 3   Toileting 3   Upper Body Dressing 3   Grooming 3   Eating 4   Daily Activity Raw Score 19   Daily Activity Standardized Score (Calc for Raw Score >=11) 40 22   AM-PAC Applied Cognition Inpatient   Following a Speech/Presentation 4   Understanding Ordinary Conversation 4   Taking Medications 3   Remembering Where Things Are Placed or Put Away 3   Remembering List of 4-5 Errands 3   Taking Care of Complicated Tasks 3   Applied Cognition Raw Score 20   Applied Cognition Standardized Score 41 76   Barthel Index   Feeding 10   Bathing 5   Grooming Score 5   Dressing Score 5   Bladder Score 10   Bowels Score 10   Toilet Use Score 5   Transfers (Bed/Chair) Score 15   Mobility (Level Surface) Score 10   Stairs Score 5   Barthel Index Score 80   Modified [place] Scale   Modified Fatmata Scale 1   Stephany Martinez, OT

## 2021-05-15 NOTE — ASSESSMENT & PLAN NOTE
· Presenting with headache, confusion  With notable neuro deficits on presentation  · NIH SS 4 on presentation  Stroke alert called in the ED  · Decision made not to give tPA due to anticoagulation status  · Was admitted under the stroke pathway  · Normotensive on presentation  · With no notable electrolyte abnormalities  · TSH normal  · With no evidence of acute infection  · , otherwise rest of the lipid panel normal  · CTA with no significant vascular stenosis  · MRI with no evidence of acute stroke  · Will restart Eliquis  · Etiology of acute confusion unclear  Differential diagnosis include TIA (although currently anticoagulated) versus migraine versus seizures  · Continue aspirin and statin   · Appreciate neurology recommendations, outpatient follow-up  Outpatient EEG  · Concerns for dementia, outpatient referral to Geriatrics  Discussed with daughter

## 2021-05-15 NOTE — ASSESSMENT & PLAN NOTE
· Presenting with headache, confusion  With notable neuro deficits on presentation  · NIH SS 4 on presentation  Stroke alert called in the ED  · Decision made not to give tPA due to anticoagulation status  · Was admitted under the stroke pathway  · Normotensive on presentation  · With no notable electrolyte abnormalities  · TSH normal  · With no evidence of acute infection  · , otherwise rest of the lipid panel normal  · CTA with no significant vascular stenosis  · MRI with no evidence of acute stroke  · Will restart Eliquis  · Etiology of acute confusion unclear  Differential diagnosis include TIA versus migraine versus seizures  · Continue aspirin and statin   · Appreciate neurology recommendations, outpatient follow-up  Outpatient EEG  · Concerns for dementia, outpatient referral to Geriatrics  Discussed with daughter

## 2021-05-21 ENCOUNTER — APPOINTMENT (OUTPATIENT)
Dept: LAB | Facility: CLINIC | Age: 86
End: 2021-05-21
Payer: COMMERCIAL

## 2021-05-21 ENCOUNTER — TRANSCRIBE ORDERS (OUTPATIENT)
Dept: LAB | Facility: CLINIC | Age: 86
End: 2021-05-21

## 2021-05-21 DIAGNOSIS — R41.0 CONFUSION: Primary | ICD-10-CM

## 2021-05-21 DIAGNOSIS — Z00.00 ROUTINE GENERAL MEDICAL EXAMINATION AT A HEALTH CARE FACILITY: ICD-10-CM

## 2021-05-21 DIAGNOSIS — Z00.00 ROUTINE GENERAL MEDICAL EXAMINATION AT A HEALTH CARE FACILITY: Primary | ICD-10-CM

## 2021-05-21 LAB
ERYTHROCYTE [SEDIMENTATION RATE] IN BLOOD: 7 MM/HOUR (ref 0–29)
TSH SERPL DL<=0.05 MIU/L-ACNC: 3.13 UIU/ML (ref 0.36–3.74)
VIT B12 SERPL-MCNC: 384 PG/ML (ref 100–900)

## 2021-05-21 PROCEDURE — 86618 LYME DISEASE ANTIBODY: CPT

## 2021-05-21 PROCEDURE — 85652 RBC SED RATE AUTOMATED: CPT

## 2021-05-21 PROCEDURE — 84443 ASSAY THYROID STIM HORMONE: CPT

## 2021-05-21 PROCEDURE — 82607 VITAMIN B-12: CPT

## 2021-05-21 PROCEDURE — 36415 COLL VENOUS BLD VENIPUNCTURE: CPT

## 2021-05-22 LAB — B BURGDOR IGG+IGM SER-ACNC: 7

## 2021-06-05 DIAGNOSIS — R29.90 STROKE-LIKE SYMPTOMS: ICD-10-CM

## 2021-06-05 DIAGNOSIS — R41.82 CHANGE IN MENTAL STATUS: ICD-10-CM

## 2021-06-05 DIAGNOSIS — I10 ESSENTIAL HYPERTENSION: ICD-10-CM

## 2021-06-21 RX ORDER — LORATADINE 10 MG
TABLET ORAL
Qty: 30 TABLET | Refills: 0 | OUTPATIENT
Start: 2021-06-21

## 2021-06-21 RX ORDER — ATORVASTATIN CALCIUM 40 MG/1
TABLET, FILM COATED ORAL
Qty: 30 TABLET | Refills: 0 | OUTPATIENT
Start: 2021-06-21

## 2021-11-17 ENCOUNTER — TELEPHONE (OUTPATIENT)
Dept: GERIATRICS | Age: 86
End: 2021-11-17

## 2024-05-15 NOTE — ASSESSMENT & PLAN NOTE
· Blood pressure stable  · Restart home antihypertensive medications, no evidence of acute stroke on MRI  · Continue to monitor blood pressure  90.7